# Patient Record
Sex: FEMALE | Race: WHITE | NOT HISPANIC OR LATINO
[De-identification: names, ages, dates, MRNs, and addresses within clinical notes are randomized per-mention and may not be internally consistent; named-entity substitution may affect disease eponyms.]

---

## 2022-08-03 ENCOUNTER — APPOINTMENT (OUTPATIENT)
Dept: ORTHOPEDIC SURGERY | Facility: CLINIC | Age: 67
End: 2022-08-03

## 2022-08-03 VITALS — WEIGHT: 186 LBS | HEIGHT: 62 IN | BODY MASS INDEX: 34.23 KG/M2

## 2022-08-03 DIAGNOSIS — Z78.9 OTHER SPECIFIED HEALTH STATUS: ICD-10-CM

## 2022-08-03 DIAGNOSIS — Z86.79 PERSONAL HISTORY OF OTHER DISEASES OF THE CIRCULATORY SYSTEM: ICD-10-CM

## 2022-08-03 PROBLEM — Z00.00 ENCOUNTER FOR PREVENTIVE HEALTH EXAMINATION: Status: ACTIVE | Noted: 2022-08-03

## 2022-08-03 PROCEDURE — 99204 OFFICE O/P NEW MOD 45 MIN: CPT | Mod: 25

## 2022-08-03 PROCEDURE — 20610 DRAIN/INJ JOINT/BURSA W/O US: CPT

## 2022-08-03 PROCEDURE — 73562 X-RAY EXAM OF KNEE 3: CPT | Mod: RT

## 2022-08-03 PROCEDURE — J3490M: CUSTOM

## 2022-08-03 RX ORDER — ATENOLOL 50 MG/1
TABLET ORAL
Refills: 0 | Status: ACTIVE | COMMUNITY

## 2022-08-03 NOTE — PROCEDURE
[Large Joint Injection] : Large joint injection [Right] : of the right [Knee] : knee [___ cc    6mg] :  Betamethasone (Celestone) ~Vcc of 6mg [___ cc    1%] : Lidocaine ~Vcc of 1%  [___ cc    0.25%] : Bupivacaine (Marcaine) ~Vcc of 0.25%  [] : Patient tolerated procedure well [Call if redness, pain or fever occur] : call if redness, pain or fever occur [Apply ice for 15min out of every hour for the next 12-24 hours as tolerated] : apply ice for 15 minutes out of every hour for the next 12-24 hours as tolerated [Patient was advised to rest the joint(s) for ____ days] : patient was advised to rest the joint(s) for [unfilled] days [Previous OTC use and PT nontherapeutic] : patient has tried OTC's including aspirin, Ibuprofen, Aleve, etc or prescription NSAIDS, and/or exercises at home and/or physical therapy without satisfactory response [Patient had decreased mobility in the joint] : patient had decreased mobility in the joint [Risks, benefits, alternatives discussed / Verbal consent obtained] : the risks benefits, and alternatives have been discussed, and verbal consent was obtained [Prior failure or difficult injection] : prior failure or difficult injection [All ultrasound images have been permanently captured and stored accordingly in our picture archiving and communication system] : All ultrasound images have been permanently captured and stored accordingly in our picture archiving and communication system [Visualization of the needle and placement of injection was performed without complication] : visualization of the needle and placement of injection was performed without complication [Inflammation] : inflammation

## 2022-08-03 NOTE — HISTORY OF PRESENT ILLNESS
[Sudden] : sudden [7] : 7 [Localized] : localized [de-identified] : 68 yo RHD Female here for Right knee pain for since June 22. Denies injury. Pain is medial knee, worse with getting up after sitting for an extended period. Has nighttime symptoms. Tried OTC tylenol/ NSAIDs with minimal relief. Denies mechanical sx. \par \par Retired\par \par No DM/ no blood thinners [] : no [FreeTextEntry1] : right knee  [FreeTextEntry3] : 2 weeks ago  [FreeTextEntry5] : pt states no injury has occurred  [FreeTextEntry9] : ibuprofen

## 2022-08-03 NOTE — PHYSICAL EXAM
[NL (0)] : extension 0 degrees [5___] : hamstring 5[unfilled]/5 [Right] : right knee [Mild tricompartmental OA lateral narrowing] : Mild tricompartmental OA lateral narrowing [Moderate tricompartmental OA medial narrowing] : Moderate tricompartmental OA medial narrowing [Moderate patellofemoral OA] : Moderate patellofemoral OA [] : non-antalgic [TWNoteComboBox7] : flexion 130 degrees

## 2022-08-18 ENCOUNTER — APPOINTMENT (OUTPATIENT)
Dept: ORTHOPEDIC SURGERY | Facility: CLINIC | Age: 67
End: 2022-08-18

## 2022-08-18 VITALS — HEIGHT: 62 IN | BODY MASS INDEX: 34.23 KG/M2 | WEIGHT: 186 LBS

## 2022-08-18 DIAGNOSIS — Z78.9 OTHER SPECIFIED HEALTH STATUS: ICD-10-CM

## 2022-08-18 PROCEDURE — 20610 DRAIN/INJ JOINT/BURSA W/O US: CPT | Mod: RT

## 2022-08-18 PROCEDURE — 99214 OFFICE O/P EST MOD 30 MIN: CPT | Mod: 25

## 2022-08-18 NOTE — IMAGING
[All Views] : anteroposterior, lateral, skyline, and anteroposterior standing [Outside films reviewed] : Outside films reviewed [There are no fractures, subluxations or dislocations. No significant abnormalities are seen] : There are no fractures, subluxations or dislocations. No significant abnormalities are seen [Moderate tricompartmental OA medial narrowing] : Moderate tricompartmental OA medial narrowing [Moderate patellofemoral OA] : Moderate patellofemoral OA [de-identified] : Right \par Knee Examination:\par \par • Constitutional: \par    - Well-developed, well-nourished, in no acute distress\par    - Alert and oriented to person, place and time \par \par • Inspection and Palpation: \par    - Skin: intact\par    - Effusion: none\par    - Medial joint line tenderness: positive\par    - Lateral joint line tenderness: negative\par    - Patellofemoral tenderness: positive\par    - Pes anserine tenderness: negative\par    - Palpable lymphadenopathy: none \par    - Peripheral edema: none\par    - Gait: normal\par \par • Range of Motion (degrees):\par    - Extension: 0\par    - Flexion: 125\par 	\par • Stability: \par    - Anterior drawer test: normal\par    - Lachman test: 1A\par    - Posterior drawer test: normal\par    - Varus stress test: normal\par    - Valgus stress test: normal \par \par • Neurovascular: \par    - Atrophy of quadriceps: absent \par    - Quadriceps strength (out of 5): 5\par    - Hamstring strength (out of 5): 5\par    - Sensation to light touch: intact in sural, saphenous, peroneal and tibial distributions\par    - Capillary refill: less than 2 seconds in toes\par \par • Special Tests:\par    - Medial Rodney’s test: positive\par    - Lateral Rodney’s test: negative \par    - Straight leg raise test: negative\par    - Hip examination: painless range of motion\par \par • Comments:\par    - None \par \par  [FreeTextEntry9] : prior films reviewed consistent with primary osteoarthritis

## 2022-08-18 NOTE — HISTORY OF PRESENT ILLNESS
[de-identified] : • Visit Details:\par    - Visit type: new injury \par    - Worker’s compensation: no\par    - No-fault: no\par \par • Patient Demographics:\par    - Age: 67\par    - Occupation: retired \par    - Sex: female\par \par • Symptom Details: \par    - Laterality: right \par    - Body part: knee \par    - Duration: 06/2022 \par    - Pain severity (out of 10): 9\par    - Pain timing: constant \par    - Pain quality: throbbing, soreness \par    - Pain interferes with sleep: no\par    - Pain radiates distally: no\par    - Associated with swelling: no\par    - Associated with catching and locking: no\par    - Associated with decreased motion: no\par    - Associated with numbness: no\par    - Worse with activity: yes\par    - Improves with rest: yes\par \par • Treatment Details:\par    - Physical therapy: home exercise program\par    - Anti-inflammatory medication: ibuprofen daily\par    - Narcotic medication: no\par    - Corticosteroid injection: yes, 08/03/2022 \par \par • Comments:\par    - Patient reports mild relief from the cortisone injection for 1 week with recurrence of symptoms. She is interested in discussing additional treatment options.  \par

## 2022-08-18 NOTE — DISCUSSION/SUMMARY
[de-identified] : Assessment\par \par The patient presents with history, examination and imaging that are most consistent with a diagnosis of:\par right knee osteoarthritis with exacerbation.\par \par The patient would like to pursue conservative measures at this time. After consideration of various non-operative treatment modalities, the patient would like to proceed with the modalities listed below.\par \par The patient is in agreement with the treatment plan and all questions were answered. \par \par …………………….\par \par Plan\par \par - Counseling: Patient recommended to modify their activities until symptoms resolve. \par - Home Exercise Program: Packet with additional exercises from [facility] provided to patient in the office today. \par - Meloxicam: Medication script sent to the patient’s preferred pharmacy. Patient was instructed to take this medication with food on an as needed basis. Patient educated on the gastrointestinal side effects with excessive use. \par - Injection: Gel injection performed today in the office. Refer to procedure section for further details.\par - Follow-up: in 1 week for next injection\par \par …………………….\par \par Medical Decision Making\par \par • Problem:\par    - Chronic illness with exacerbation - moderate\par • Data:\par    - Review of available external records - low\par • Risk:\par    - Prescription medication - moderate\par    - Injection - moderate\par • MDM Level:\par    - Level 4\par

## 2022-08-18 NOTE — PROCEDURE
[FreeTextEntry3] : Procedure Note\par \par • Injection Details: \par    - Laterality: right \par    - Body part: knee \par    - Anatomic location: joint \par \par • Injection Contents: \par    - 1st Medication name: orthovisc\par    - 1st Medication dose: 30 mg\par    - 2nd Medication name: 1% lidocaine\par    - 2nd Medication dose: 4 mL\par \par • Procedure Narrative:\par    - Informed consent was provided for injection of the specified location. \par    - Patient informed of gel injection risks including allergic reaction, infection and skin discoloration. \par    - Discussed with patient that therapeutic benefit from gel injection was less predictable in the setting of recent cortisone injection.\par    - The above specified medications were injected using aseptic technique. \par    - A sterile dressing was applied. \par    - There were no complications. \par \par

## 2022-08-25 ENCOUNTER — APPOINTMENT (OUTPATIENT)
Dept: ORTHOPEDIC SURGERY | Facility: CLINIC | Age: 67
End: 2022-08-25

## 2022-08-25 VITALS — BODY MASS INDEX: 34.23 KG/M2 | HEIGHT: 62 IN | WEIGHT: 186 LBS

## 2022-08-25 PROCEDURE — 99214 OFFICE O/P EST MOD 30 MIN: CPT | Mod: 25

## 2022-08-25 PROCEDURE — 20610 DRAIN/INJ JOINT/BURSA W/O US: CPT

## 2022-08-25 NOTE — HISTORY OF PRESENT ILLNESS
[de-identified] : • Visit Details:\par    - Visit type: follow up\par    - Worker’s compensation: no\par    - No-fault: no\par \par • Patient Demographics:\par    - Age: 67\par    - Occupation: retired \par    - Sex: female\par \par • Symptom Details: \par    - Laterality: right \par    - Body part: knee \par    - Duration: 06/2022 \par    - Pain severity (out of 10): 4\par    - Pain timing: constant \par    - Pain quality: throbbing, soreness \par    - Pain interferes with sleep: yes\par    - Pain radiates distally: no\par    - Associated with swelling: no\par    - Associated with catching and locking: no\par    - Associated with decreased motion: no\par    - Associated with numbness: no\par    - Worse with activity: yes\par    - Improves with rest: yes\par \par • Treatment Details:\par    - Physical therapy: home exercise program\par    - Anti-inflammatory medication: ibuprofen daily\par    - Narcotic medication: no\par    - Corticosteroid injection: yes, 08/03/2022 \par \par • Comments:\par    - Patient reports mild relief from the first gel injection. She would like to proceed with the second injection. \par

## 2022-08-25 NOTE — PROCEDURE
[FreeTextEntry3] : • Injection Details: \par  - Laterality: right \par  - Body part: knee \par  - Anatomic location: joint \par \par • Injection Contents: \par  - 1st Medication name: orthovisc\par  - 1st Medication dose: 30 mg\par  - 2nd Medication name: 1% lidocaine\par  - 2nd Medication dose: 4 mL\par \par • Procedure Narrative:\par  - Informed consent was provided for injection of the specified location. \par  - Patient informed of gel injection risks including allergic reaction, infection and skin discoloration. \par  - The above specified medications were injected using aseptic technique. \par  - A sterile dressing was applied. \par  - There were no complications.

## 2022-08-25 NOTE — DISCUSSION/SUMMARY
[de-identified] : Assessment\par \par The patient presents with history, examination and imaging that are most consistent with a diagnosis of:\par right knee osteoarthritis with exacerbation\par \par The patient would like to pursue conservative measures at this time. After consideration of various non-operative treatment modalities, the patient would like to proceed with the modalities listed below.\par \par The patient is in agreement with the treatment plan and all questions were answered. \par \par …………………….\par \par Plan\par \par - Counseling: Patient recommended to modify their activities until symptoms resolve. \par - Home Exercise Program: Patient will continue program as provided at previous visit\par - Meloxicam: Patient was instructed to take this medication with food on an as needed basis. Patient educated on the gastrointestinal side effects with excessive use. \par - Injection: Gel injection performed today in the office. Refer to procedure section for further details.\par - Follow-up: in 1 week for next injection\par \par …………………….\par \par Medical Decision Making\par \par • Problem:\par  - Chronic illness with exacerbation - moderate\par • Data:\par  - Review of available external records - low\par • Risk:\par  - Injection - moderate\par • MDM Level:\par  - Level 4\par

## 2022-08-25 NOTE — IMAGING
[de-identified] : Right \par Knee Examination:\par \par • Constitutional: \par  - Well-developed, well-nourished, in no acute distress\par  - Alert and oriented to person, place and time \par \par • Inspection and Palpation: \par  - Skin: intact\par  - Effusion: none\par  - Medial joint line tenderness: positive\par  - Lateral joint line tenderness: negative\par  - Patellofemoral tenderness: positive\par  - Pes anserine tenderness: negative\par  - Palpable lymphadenopathy: none \par  - Peripheral edema: none\par  - Gait: normal\par \par • Range of Motion (degrees):\par  - Extension: 0\par  - Flexion: 125\par 	\par • Stability: \par  - Anterior drawer test: normal\par  - Lachman test: 1A\par  - Posterior drawer test: normal\par  - Varus stress test: normal\par  - Valgus stress test: normal \par \par • Neurovascular: \par  - Atrophy of quadriceps: absent \par  - Quadriceps strength (out of 5): 5\par  - Hamstring strength (out of 5): 5\par  - Sensation to light touch: intact in sural, saphenous, peroneal and tibial distributions\par  - Capillary refill: less than 2 seconds in toes\par \par • Special Tests:\par  - Medial Rodney’s test: positive\par  - Lateral Rodney’s test: negative \par  - Straight leg raise test: negative\par  - Hip examination: painless range of motion\par \par • Comments:\par  - None \par

## 2022-09-01 ENCOUNTER — APPOINTMENT (OUTPATIENT)
Dept: ORTHOPEDIC SURGERY | Facility: CLINIC | Age: 67
End: 2022-09-01

## 2022-09-01 VITALS — HEIGHT: 62 IN | BODY MASS INDEX: 34.23 KG/M2 | WEIGHT: 186 LBS

## 2022-09-01 PROCEDURE — 20610 DRAIN/INJ JOINT/BURSA W/O US: CPT | Mod: RT

## 2022-09-01 PROCEDURE — 99214 OFFICE O/P EST MOD 30 MIN: CPT | Mod: 25

## 2022-09-01 NOTE — HISTORY OF PRESENT ILLNESS
[de-identified] : • Visit Details:\par    - Visit type: follow up\par    - Worker’s compensation: no\par    - No-fault: no\par \par • Patient Demographics:\par    - Age: 67\par    - Occupation: retired \par    - Sex: female\par \par • Symptom Details: \par    - Laterality: right \par    - Body part: knee \par    - Duration: 06/2022 \par    - Pain severity (out of 10): 1\par    - Pain timing: constant \par    - Pain quality: throbbing, soreness \par    - Pain interferes with sleep: no\par    - Pain radiates distally: no\par    - Associated with swelling: no\par    - Associated with catching and locking: no\par    - Associated with decreased motion: no\par    - Associated with numbness: no\par    - Worse with activity: no\par    - Improves with rest: yes\par \par • Treatment Details:\par    - Physical therapy: home exercise program\par    - Anti-inflammatory medication: ibuprofen daily\par    - Narcotic medication: no\par    - Corticosteroid injection: yes, 08/03/2022 \par \par • Comments:\par    - Patient reports symptomatic improvement with two previous orthovisc injections. Would like to proceed with her third injection. \par

## 2022-09-01 NOTE — DISCUSSION/SUMMARY
[de-identified] : Assessment\par \par The patient presents with history, examination and imaging that are most consistent with a diagnosis of:\par right knee osteoarthritis with exacerbation\par \par The patient would like to pursue conservative measures at this time. After consideration of various non-operative treatment modalities, the patient would like to proceed with the modalities listed below.\par \par The patient is in agreement with the treatment plan and all questions were answered. \par \par …………………….\par \par Plan\par \par - Counseling: Patient recommended to modify their activities until symptoms resolve. \par - Home Exercise Program: Patient will continue program as provided at previous visit\par - Meloxicam: Patient was instructed to take this medication with food on an as needed basis. Patient educated on the gastrointestinal side effects with excessive use. \par - Injection: Gel injection performed today in the office. Refer to procedure section for further details.\par - Follow-up: in 1 week for final injection\par \par …………………….\par \par Medical Decision Making\par \par • Problem:\par  - Chronic illness with exacerbation - moderate\par • Data:\par  - Review of available external records - low\par • Risk:\par  - Injection - moderate\par • MDM Level:\par  - Level 4\par

## 2022-09-01 NOTE — IMAGING
[de-identified] : Right \par Knee Examination:\par \par • Constitutional: \par  - Well-developed, well-nourished, in no acute distress\par  - Alert and oriented to person, place and time \par \par • Inspection and Palpation: \par  - Skin: intact\par  - Effusion: none\par  - Medial joint line tenderness: positive\par  - Lateral joint line tenderness: negative\par  - Patellofemoral tenderness: positive\par  - Pes anserine tenderness: negative\par  - Palpable lymphadenopathy: none \par  - Peripheral edema: none\par  - Gait: normal\par \par • Range of Motion (degrees):\par  - Extension: 0\par  - Flexion: 125\par 	\par • Stability: \par  - Anterior drawer test: normal\par  - Lachman test: 1A\par  - Posterior drawer test: normal\par  - Varus stress test: normal\par  - Valgus stress test: normal \par \par • Neurovascular: \par  - Atrophy of quadriceps: absent \par  - Quadriceps strength (out of 5): 5\par  - Hamstring strength (out of 5): 5\par  - Sensation to light touch: intact in sural, saphenous, peroneal and tibial distributions\par  - Capillary refill: less than 2 seconds in toes\par \par • Special Tests:\par  - Medial Rodney’s test: positive\par  - Lateral Rodney’s test: negative \par  - Straight leg raise test: negative\par  - Hip examination: painless range of motion\par \par • Comments:\par  - None \par

## 2022-09-01 NOTE — PROCEDURE
[FreeTextEntry3] : Procedure Note: • Injection Details: \par  - Laterality: right \par  - Body part: knee \par  - Anatomic location: joint \par \par • Injection Contents: \par  - 1st Medication name: orthovisc\par  - 1st Medication dose: 30 mg\par  - 2nd Medication name: 1% lidocaine\par  - 2nd Medication dose: 4 mL\par \par • Procedure Narrative:\par  - Informed consent was provided for injection of the specified location. \par  - Patient informed of gel injection risks including allergic reaction, infection and skin discoloration. \par  - The above specified medications were injected using aseptic technique. \par  - A sterile dressing was applied. \par  - There were no complications. \par

## 2022-09-15 ENCOUNTER — APPOINTMENT (OUTPATIENT)
Dept: ORTHOPEDIC SURGERY | Facility: CLINIC | Age: 67
End: 2022-09-15

## 2022-09-15 VITALS — BODY MASS INDEX: 34.23 KG/M2 | HEIGHT: 62 IN | WEIGHT: 186 LBS

## 2022-09-15 DIAGNOSIS — M17.11 UNILATERAL PRIMARY OSTEOARTHRITIS, RIGHT KNEE: ICD-10-CM

## 2022-09-15 PROCEDURE — 20610 DRAIN/INJ JOINT/BURSA W/O US: CPT | Mod: RT

## 2022-09-15 PROCEDURE — 99214 OFFICE O/P EST MOD 30 MIN: CPT | Mod: 25

## 2022-09-15 NOTE — HISTORY OF PRESENT ILLNESS
[de-identified] : • Visit Details:\par    - Visit type: follow up\par    - Worker’s compensation: no\par    - No-fault: no\par \par • Patient Demographics:\par    - Age: 67\par    - Occupation: retired \par    - Sex: female\par \par • Symptom Details: \par    - Laterality: right \par    - Body part: knee \par    - Duration: 06/2022 \par    - Pain severity (out of 10): 2-3\par    - Pain timing: constant \par    - Pain quality: throbbing, soreness \par    - Pain interferes with sleep: no\par    - Pain radiates distally: no\par    - Associated with swelling: no\par    - Associated with catching and locking: no\par    - Associated with decreased motion: no\par    - Associated with numbness: no\par    - Worse with activity: no\par    - Improves with rest: yes\par \par • Treatment Details:\par    - Physical therapy: home exercise program\par    - Anti-inflammatory medication: ibuprofen daily\par    - Narcotic medication: no\par    - Corticosteroid injection: yes, 08/03/2022 \par \par • Comments:\par    - Patient reports continued improvement since her last injection. As she has mild persistent symptoms, presenting today for fourth and final HA injection.\par

## 2022-09-15 NOTE — DISCUSSION/SUMMARY
[de-identified] : Assessment\par \par The patient presents with history, examination and imaging that are most consistent with a diagnosis of:\par right knee osteoarthritis with exacerbation\par \par The patient would like to pursue conservative measures at this time. After consideration of various non-operative treatment modalities, the patient would like to proceed with the modalities listed below.\par \par The patient is in agreement with the treatment plan and all questions were answered. \par \par …………………….\par \par Plan\par \par - Counseling: Patient recommended to modify their activities until symptoms resolve. \par - Home Exercise Program: Patient will continue program as provided at previous visit\par - Meloxicam: Patient was instructed to take this medication with food on an as needed basis. Patient educated on the gastrointestinal side effects with excessive use. \par - Injection: Gel injection performed today in the office. Refer to procedure section for further details.\par - Follow-up: as needed if symptoms recur or fail to improve\par

## 2022-09-15 NOTE — PROCEDURE
[FreeTextEntry3] : Procedure Note\par \par • Injection Details: \par    - Laterality: right \par    - Body part: knee \par    - Anatomic location: joint \par \par • Injection Contents: \par    - 1st Medication: orthovisc\par \par • Procedure Narrative:\par    - Informed consent was provided for injection of the specified location. \par    - Patient informed of hyaluronic acid risks including infection and local inflammatory reaction.\par    - The above specified medications were injected using aseptic technique. \par    - A sterile dressing was applied. \par    - There were no complications. \par \par

## 2022-10-27 ENCOUNTER — APPOINTMENT (OUTPATIENT)
Dept: ORTHOPEDIC SURGERY | Facility: CLINIC | Age: 67
End: 2022-10-27

## 2022-10-27 VITALS — WEIGHT: 186 LBS | HEIGHT: 62 IN | BODY MASS INDEX: 34.23 KG/M2

## 2022-10-27 PROCEDURE — 99214 OFFICE O/P EST MOD 30 MIN: CPT | Mod: 25

## 2022-10-27 PROCEDURE — 73564 X-RAY EXAM KNEE 4 OR MORE: CPT | Mod: LT

## 2022-10-27 PROCEDURE — 20610 DRAIN/INJ JOINT/BURSA W/O US: CPT

## 2022-10-27 RX ORDER — ATENOLOL AND CHLORTHALIDONE 50; 25 MG/1; MG/1
50-25 TABLET ORAL
Qty: 90 | Refills: 0 | Status: ACTIVE | COMMUNITY
Start: 2022-07-13

## 2022-10-27 NOTE — DISCUSSION/SUMMARY
[de-identified] : Assessment\par \par The patient presents with history, examination and imaging that are most consistent with a diagnosis of:\par left knee osteoarthritis with exacerbation\par \par The patient would like to pursue conservative measures at this time. She has attempted rest, activity modification, NSAIDs and HEP without relief. Will proceed with injection for symptomatic relief. After consideration of various non-operative treatment modalities, the patient would like to proceed with the modalities listed below.\par \par The risks and benefits of additional diagnostic imaging were discussed. The patient agrees to defer at this time in favor of a trial of conservative treatment.\par \par The patient is in agreement with the treatment plan and all questions were answered. \par \par ..........\par \par Plan\par \par - Counseling: Patient recommended to modify their activities until symptoms resolve. \par - Home Exercise Program: Packet with exercises from [facility] provided to patient in the office today. \par - Injection: Hyaluronic acid injection performed today in the office. Refer to procedure section for further details.\par - Follow-up: 1 week for second injection\par \par ..........\par \par Medical Decision Making\par \par • Problem:\par    - Chronic illness with exacerbation - moderate\par • Data:\par    - Review of available external records - low\par • Risk:\par    - Injection - moderate\par • MDM Level:\par    - Level 4\par

## 2022-10-27 NOTE — HISTORY OF PRESENT ILLNESS
[de-identified] : The patient presents to clinic for evaluation of the complaint described below.\par \par • Visit Details:\par    - Visit type: new injury \par    - Worker’s compensation: no\par    - No-fault: no\par \par • Patient Demographics:\par    - Age: 67 year\par    - Occupation: retired\par    - Sex: female\par \par • Symptom Details: \par    - Laterality: left\par    - Body part: knee \par    - Duration:  2 weeks\par    - Pain severity (out of 10):  5\par    - Pain timing:  intermittent \par    - Pain quality: dull\par    - Pain interferes with sleep: no\par    - Pain radiates distally: no\par    - Associated with swelling: yes\par    - Associated with catching and locking: no\par    - Associated with decreased motion: no\par    - Associated with numbness: no\par    - Worse with activity: yes\par    - Improves with rest: yes\par \par • Treatment Details:\par    - Physical therapy: yes home exercises\par    - Anti-inflammatory medication: yes OTC \par    - Narcotic medication: no\par    - Corticosteroid injection: no\par \par • Comments:\par    - Medial left knee pain, worse with activity. Attempted rest, activity modification, NSAID and home exercises without relief. Requesting injection. She reports improvement of the right knee with gel injections. \par \par

## 2022-10-27 NOTE — IMAGING
[de-identified] : Left Knee Examination:\par \par • Constitutional: \par    - Well-developed, well-nourished, in no acute distress\par    - Alert and oriented to person, place and time \par \par • Inspection and Palpation: \par    - Skin: intact\par    - Effusion: none\par    - Medial joint line tenderness: positive\par    - Lateral joint line tenderness: negative\par    - Patellofemoral tenderness: negative\par    - Pes anserine tenderness: negative\par    - Palpable lymphadenopathy: none \par    - Peripheral edema: none\par    - Gait: normal\par \par • Range of Motion (degrees):\par    - Extension: 0\par    - Flexion: 135\par 	\par • Stability: \par    - Anterior drawer test: normal\par    - Lachman test: 1A\par    - Posterior drawer test: normal\par    - Varus stress test: normal\par    - Valgus stress test: normal \par \par • Neurovascular: \par    - Atrophy of quadriceps: absent \par    - Quadriceps strength (out of 5): 5\par    - Hamstring strength (out of 5): 5\par    - Sensation to light touch: intact in sural, saphenous, peroneal and tibial distributions\par    - Capillary refill: less than 2 seconds in toes\par \par • Special Tests:\par    - Medial Rodney’s test: positive\par    - Lateral Rodney’s test: negative \par    - Patellofemoral grind: negative\par    - Straight leg raise test: negative\par    - Hip examination: painless range of motion\par \par • Comments:\par    - None \par \par  [Left] : left knee [All Views] : anteroposterior, lateral, skyline, and anteroposterior standing [Moderate tricompartmental OA medial narrowing] : Moderate tricompartmental OA medial narrowing [Mild patellofemoral OA] : Mild patellofemoral OA

## 2022-10-27 NOTE — PROCEDURE
[FreeTextEntry3] : Procedure Note\par \par • Injection Details: \par    - Laterality: left\par    - Body part: knee \par    - Anatomic location: joint \par \par • Injection Contents: \par    - 1st Medication: Orthovisc\par    - 2nd Medication: 1% lidocaine, 5 mL\par \par • Procedure Narrative:\par    - Informed consent was provided for injection of the specified location.  \par    - Patient informed of hyaluronic acid risks including infection and local inflammatory reaction.\par    - The above specified medications were injected using aseptic technique. \par    - A sterile dressing was applied. \par    - There were no complications. \par \par

## 2022-11-03 ENCOUNTER — APPOINTMENT (OUTPATIENT)
Dept: ORTHOPEDIC SURGERY | Facility: CLINIC | Age: 67
End: 2022-11-03

## 2022-11-03 VITALS — BODY MASS INDEX: 34.23 KG/M2 | WEIGHT: 186 LBS | HEIGHT: 62 IN

## 2022-11-03 PROCEDURE — 99214 OFFICE O/P EST MOD 30 MIN: CPT | Mod: 25

## 2022-11-03 PROCEDURE — 20610 DRAIN/INJ JOINT/BURSA W/O US: CPT | Mod: LT

## 2022-11-03 NOTE — DISCUSSION/SUMMARY
[de-identified] : Assessment\par \par The patient presents with history, examination and imaging that are most consistent with a diagnosis of:\par left knee osteoarthritis with exacerbation\par \par The patient would like to pursue conservative measures at this time. Patient would like to proceed with her second gel injection. \par \par The patient is in agreement with the treatment plan and all questions were answered. \par \par ..........\par \par Plan\par \par - Counseling: Patient recommended to modify their activities until symptoms resolve. \par - Home Exercise Program: Packet with exercises from [employer] provided to patient in the office today. \par - Injection: Hyaluronic acid injection performed today in the office. Refer to procedure section for further details.\par - Follow-up: 1 week for third injection\par \par ..........\par \par Medical Decision Making\par \par • Problem:\par  - Chronic illness with exacerbation - moderate\par • Data:\par  - Review of available external records - low\par • Risk:\par  - Injection - moderate\par • MDM Level:\par  - Level 4\par

## 2022-11-03 NOTE — HISTORY OF PRESENT ILLNESS
[de-identified] : The patient presents to clinic for evaluation of the complaint described below.\par \par • Visit Details:\par    - Visit type: follow up\par    - Worker’s compensation: no\par    - No-fault: no\par \par • Patient Demographics:\par    - Age: 67 year\par    - Occupation: retired\par    - Sex: female\par \par • Symptom Details: \par    - Laterality: left\par    - Body part: knee \par    - Duration:  2 weeks\par    - Pain severity (out of 10):  6-7\par    - Pain timing:  intermittent \par    - Pain quality: dull\par    - Pain interferes with sleep: no\par    - Pain radiates distally: no\par    - Associated with swelling: yes\par    - Associated with catching and locking: no\par    - Associated with decreased motion: no\par    - Associated with numbness: no\par    - Worse with activity: yes\par    - Improves with rest: yes\par \par • Treatment Details:\par    - Physical therapy: yes home exercises\par    - Anti-inflammatory medication: yes OTC \par    - Narcotic medication: no\par    - Corticosteroid injection: no\par \par • Comments:\par    - States no changes since last appt. still having pain and discomfort. Here for gel injection #2.

## 2022-11-03 NOTE — IMAGING
[de-identified] : Left Knee Examination:\par \par • Constitutional: \par  - Well-developed, well-nourished, in no acute distress\par  - Alert and oriented to person, place and time \par \par • Inspection and Palpation: \par  - Skin: intact\par  - Effusion: none\par  - Medial joint line tenderness: positive\par  - Lateral joint line tenderness: negative\par  - Patellofemoral tenderness: negative\par  - Pes anserine tenderness: negative\par  - Palpable lymphadenopathy: none \par  - Peripheral edema: none\par  - Gait: normal\par \par • Range of Motion (degrees):\par  - Extension: 0\par  - Flexion: 135\par 	\par • Stability: \par  - Anterior drawer test: normal\par  - Lachman test: 1A\par  - Posterior drawer test: normal\par  - Varus stress test: normal\par  - Valgus stress test: normal \par \par • Neurovascular: \par  - Atrophy of quadriceps: absent \par  - Quadriceps strength (out of 5): 5\par  - Hamstring strength (out of 5): 5\par  - Sensation to light touch: intact in sural, saphenous, peroneal and tibial distributions\par  - Capillary refill: less than 2 seconds in toes\par \par • Special Tests:\par  - Medial Rodney’s test: positive\par  - Lateral Rodney’s test: negative \par  - Patellofemoral grind: negative\par  - Straight leg raise test: negative\par  - Hip examination: painless range of motion\par \par • Comments:\par  - None \par

## 2022-11-10 ENCOUNTER — APPOINTMENT (OUTPATIENT)
Dept: ORTHOPEDIC SURGERY | Facility: CLINIC | Age: 67
End: 2022-11-10

## 2022-11-10 VITALS — BODY MASS INDEX: 34.23 KG/M2 | HEIGHT: 62 IN | WEIGHT: 186 LBS

## 2022-11-10 PROCEDURE — 99214 OFFICE O/P EST MOD 30 MIN: CPT | Mod: 25

## 2022-11-10 PROCEDURE — 20610 DRAIN/INJ JOINT/BURSA W/O US: CPT | Mod: LT

## 2022-11-10 NOTE — IMAGING
[de-identified] : Left Knee Examination:\par \par • Constitutional: \par  - Well-developed, well-nourished, in no acute distress\par  - Alert and oriented to person, place and time \par \par • Inspection and Palpation: \par  - Skin: intact\par  - Effusion: none\par  - Medial joint line tenderness: positive\par  - Lateral joint line tenderness: negative\par  - Patellofemoral tenderness: negative\par  - Pes anserine tenderness: negative\par  - Palpable lymphadenopathy: none \par  - Peripheral edema: none\par  - Gait: normal\par \par • Range of Motion (degrees):\par  - Extension: 0\par  - Flexion: 135\par 	\par • Stability: \par  - Anterior drawer test: normal\par  - Lachman test: 1A\par  - Posterior drawer test: normal\par  - Varus stress test: normal\par  - Valgus stress test: normal \par \par • Neurovascular: \par  - Atrophy of quadriceps: absent \par  - Quadriceps strength (out of 5): 5\par  - Hamstring strength (out of 5): 5\par  - Sensation to light touch: intact in sural, saphenous, peroneal and tibial distributions\par  - Capillary refill: less than 2 seconds in toes\par \par • Special Tests:\par  - Medial Rodney’s test: positive\par  - Lateral Rodney’s test: negative \par  - Patellofemoral grind: negative\par  - Straight leg raise test: negative\par  - Hip examination: painless range of motion\par \par • Comments:\par  - None \par

## 2022-11-10 NOTE — PROCEDURE
[FreeTextEntry3] :  • Injection Details: \par  - Laterality: left\par  - Body part: knee \par  - Anatomic location: joint \par \par • Injection Contents: \par  - 1st Medication: Orthovisc\par  - 2nd Medication: 1% lidocaine, 3 mL\par \par • Procedure Narrative:\par  - Informed consent was provided for injection of the specified location. \par  - Patient informed of hyaluronic acid risks including infection and local inflammatory reaction.\par  - The above specified medications were injected using aseptic technique. \par  - A sterile dressing was applied. \par  - There were no complications. \par

## 2022-11-10 NOTE — DISCUSSION/SUMMARY
[de-identified] : Assessment\par \par The patient presents with history, examination and imaging that are most consistent with a diagnosis of:\par left knee osteoarthritis with exacerbation\par \par The patient would like to pursue conservative measures at this time. Patient would like to proceed with her third gel injection. \par \par The patient is in agreement with the treatment plan and all questions were answered. \par \par ..........\par \par Plan\par \par - Counseling: Patient recommended to modify their activities until symptoms resolve. \par - Home Exercise Program: Continue as per prior packet. \par - Injection: Hyaluronic acid injection performed today in the office. Refer to procedure section for further details.\par - Follow-up: 1 week for fourth and final injection\par \par ..........\par \par Medical Decision Making\par \par • Problem:\par  - Chronic illness with exacerbation - moderate\par • Data:\par  - Review of available external records - low\par • Risk:\par  - Injection - moderate\par • MDM Level:\par  - Level 4\par

## 2022-11-10 NOTE — HISTORY OF PRESENT ILLNESS
[de-identified] : The patient presents to clinic for evaluation of the complaint described below.\par \par • Visit Details:\par    - Visit type: follow up\par    - Worker’s compensation: no\par    - No-fault: no\par \par • Patient Demographics:\par    - Age: 67 year\par    - Occupation: retired\par    - Sex: female\par \par • Symptom Details: \par    - Laterality: left\par    - Body part: knee \par    - Duration:  1 month\par    - Pain severity (out of 10):  4\par    - Pain timing:  intermittent \par    - Pain quality: dull\par    - Pain interferes with sleep: no\par    - Pain radiates distally: no\par    - Associated with swelling: yes\par    - Associated with catching and locking: no\par    - Associated with decreased motion: no\par    - Associated with numbness: no\par    - Worse with activity: yes\par    - Improves with rest: yes\par \par • Treatment Details:\par    - Physical therapy: yes home exercises\par    - Anti-inflammatory medication: yes OTC \par    - Narcotic medication: no\par    - Corticosteroid injection: no\par \par • Comments:\par    - States mild improvement since the previous injection. Here for gel injection #3.

## 2022-11-17 ENCOUNTER — APPOINTMENT (OUTPATIENT)
Dept: ORTHOPEDIC SURGERY | Facility: CLINIC | Age: 67
End: 2022-11-17

## 2022-11-17 VITALS — HEIGHT: 62 IN | WEIGHT: 186 LBS | BODY MASS INDEX: 34.23 KG/M2

## 2022-11-17 PROCEDURE — 20610 DRAIN/INJ JOINT/BURSA W/O US: CPT | Mod: LT

## 2022-11-17 PROCEDURE — 99214 OFFICE O/P EST MOD 30 MIN: CPT | Mod: 25

## 2022-11-17 NOTE — DISCUSSION/SUMMARY
[de-identified] : Assessment\par \par The patient presents with history, examination and imaging that are most consistent with a diagnosis of:\par left knee osteoarthritis with exacerbation\par \par The patient would like to pursue conservative measures at this time. Patient would like to proceed with her fourth gel injection. \par \par The patient is in agreement with the treatment plan and all questions were answered. \par \par ..........\par \par Plan\par \par - Counseling: Patient recommended to modify their activities until symptoms resolve. \par - Home Exercise Program: Continue as per prior packet. \par - Injection: Hyaluronic acid injection performed today in the office. Refer to procedure section for further details.\par - Follow-up: as needed \par \par ..........\par \par Medical Decision Making\par \par • Problem:\par  - Chronic illness with exacerbation - moderate\par • Data:\par  - Review of available external records - low\par • Risk:\par  - Injection - moderate\par • MDM Level:\par  - Level 4\par

## 2022-11-17 NOTE — HISTORY OF PRESENT ILLNESS
[de-identified] : The patient presents to clinic for evaluation of the complaint described below.\par \par • Visit Details:\par    - Visit type: follow up\par    - Worker’s compensation: no\par    - No-fault: no\par \par • Patient Demographics:\par    - Age: 67 year\par    - Occupation: retired\par    - Sex: female\par \par • Symptom Details: \par    - Laterality: left\par    - Body part: knee \par    - Duration:  2 months\par    - Pain severity (out of 10):  1\par    - Pain timing:  intermittent \par    - Pain quality: dull\par    - Pain interferes with sleep: no\par    - Pain radiates distally: no\par    - Associated with swelling: yes\par    - Associated with catching and locking: no\par    - Associated with decreased motion: no\par    - Associated with numbness: no\par    - Worse with activity: yes\par    - Improves with rest: yes\par \par • Treatment Details:\par    - Physical therapy: yes home exercises\par    - Anti-inflammatory medication: yes OTC \par    - Narcotic medication: no\par    - Corticosteroid injection: no\par \par • Comments:\par    - States overall the knee feels better since her last visit. Here for gel injection #4.

## 2022-11-17 NOTE — PROCEDURE
[FreeTextEntry3] : • Injection Details: \par  - Laterality: left\par  - Body part: knee \par  - Anatomic location: joint \par \par • Injection Contents: \par  - 1st Medication: Orthovisc\par  - 2nd Medication: 1% lidocaine, 3 mL\par \par • Procedure Narrative:\par  - Informed consent was provided for injection of the specified location. \par  - Patient informed of hyaluronic acid risks including infection and local inflammatory reaction.\par  - The above specified medications were injected using aseptic technique. \par  - A sterile dressing was applied. \par  - There were no complications.

## 2022-11-17 NOTE — IMAGING
[de-identified] : Left Knee Examination:\par \par • Constitutional: \par  - Well-developed, well-nourished, in no acute distress\par  - Alert and oriented to person, place and time \par \par • Inspection and Palpation: \par  - Skin: intact\par  - Effusion: none\par  - Medial joint line tenderness: positive\par  - Lateral joint line tenderness: negative\par  - Patellofemoral tenderness: negative\par  - Pes anserine tenderness: negative\par  - Palpable lymphadenopathy: none \par  - Peripheral edema: none\par  - Gait: normal\par \par • Range of Motion (degrees):\par  - Extension: 0\par  - Flexion: 135\par 	\par • Stability: \par  - Anterior drawer test: normal\par  - Lachman test: 1A\par  - Posterior drawer test: normal\par  - Varus stress test: normal\par  - Valgus stress test: normal \par \par • Neurovascular: \par  - Atrophy of quadriceps: absent \par  - Quadriceps strength (out of 5): 5\par  - Hamstring strength (out of 5): 5\par  - Sensation to light touch: intact in sural, saphenous, peroneal and tibial distributions\par  - Capillary refill: less than 2 seconds in toes\par \par • Special Tests:\par  - Medial Rodney’s test: positive\par  - Lateral Rodney’s test: negative \par  - Patellofemoral grind: negative\par  - Straight leg raise test: negative\par  - Hip examination: painless range of motion\par \par • Comments:\par  - None \par

## 2022-12-15 ENCOUNTER — APPOINTMENT (OUTPATIENT)
Dept: ORTHOPEDIC SURGERY | Facility: CLINIC | Age: 67
End: 2022-12-15

## 2022-12-15 VITALS — BODY MASS INDEX: 34.23 KG/M2 | WEIGHT: 186 LBS | HEIGHT: 62 IN

## 2022-12-15 PROCEDURE — 99214 OFFICE O/P EST MOD 30 MIN: CPT | Mod: 25

## 2022-12-15 PROCEDURE — 20610 DRAIN/INJ JOINT/BURSA W/O US: CPT

## 2022-12-15 RX ORDER — MELOXICAM 15 MG/1
15 TABLET ORAL DAILY
Qty: 30 | Refills: 0 | Status: ACTIVE | COMMUNITY
Start: 2022-08-18 | End: 1900-01-01

## 2022-12-15 NOTE — IMAGING
[de-identified] : Left Knee Examination:\par \par • Constitutional: \par  - Well-developed, well-nourished, in no acute distress\par  - Alert and oriented to person, place and time \par \par • Inspection and Palpation: \par  - Skin: intact\par  - Effusion: none\par  - Medial joint line tenderness: positive\par  - Lateral joint line tenderness: negative\par  - Patellofemoral tenderness: negative\par  - Pes anserine tenderness: negative\par  - Palpable lymphadenopathy: none \par  - Peripheral edema: none\par  - Gait: normal\par \par • Range of Motion (degrees):\par  - Extension: 0\par  - Flexion: 135\par 	\par • Stability: \par  - Anterior drawer test: normal\par  - Lachman test: 1A\par  - Posterior drawer test: normal\par  - Varus stress test: normal\par  - Valgus stress test: normal \par \par • Neurovascular: \par  - Atrophy of quadriceps: absent \par  - Quadriceps strength (out of 5): 5\par  - Hamstring strength (out of 5): 5\par  - Sensation to light touch: intact in sural, saphenous, peroneal and tibial distributions\par  - Capillary refill: less than 2 seconds in toes\par \par • Special Tests:\par  - Medial Rodney’s test: positive\par  - Lateral Rodney’s test: negative \par  - Patellofemoral grind: negative\par  - Straight leg raise test: negative\par  - Hip examination: painless range of motion\par \par • Comments:\par  - None \par

## 2022-12-15 NOTE — HISTORY OF PRESENT ILLNESS
[FreeTextEntry1] : Left knee  [de-identified] : (Follow-Up Visit) \par \par Symptom Details\par -Body part: Left knee \par -Pain score at rest: 6\par -Pain score with activity: 8\par -Improvement since last visit: no\par \par Interval Treatment Details\par -Activity modification: rest\par -Medication: ibuprofen\par -Physical therapy: no\par -Home exercise program: yes\par -Injection: yes orthovisc 1 month ago, but has not helped\par -MRI: \par \par \par Please refer to previous office visit notes for additional history.\par

## 2022-12-15 NOTE — DISCUSSION/SUMMARY
[de-identified] : (Assessment)\par \par History, clinical examination and imaging were most consistent with:\par -left knee osteoarthritis\par \par The diagnosis was explained in detail. The potential non-surgical and surgical treatments were reviewed. The relative risks and benefits of each option were considered relative to the patient’s age, activity level, medical history, symptom severity and previously attempted treatments. \par \par -The patient chose to continue with non-surgical treatment.\par -The patient is interested in a cortisone injection today for symptom relief.\par -We discussed that in the setting of recent HA injection, response to cortisone is less predictable. She would like to proceed. \par -We discussed that should she fail to improve, she would be a candidate for TKA referral. \par \par \par (Plan)\par \par -Activity modification\par -Physical therapy: script provided\par -Injection: performed in the office today, details in procedure note. \par -Meloxicam script renewal was provided, GI precautions given\par -Follow up: 6 weeks if failing to improve\par \par \par (MDM) \par \par Problem Complexity\par -Moderate: chronic illness with exacerbation\par \par Risk\par -Moderate: injection\par \par \par (Injection Procedure Note)\par \par Laterality \par -Left\par \par Location\par -Knee joint\par \par Contents\par -40 mg kenalog \par -5 mL of 1% lidocaine\par \par Narrative\par -Discussed possible risks of corticosteroid injection including hyperglycemia, infection and skin discoloration. \par - An interval of at least 3 months between an injection and any future surgical intervention involving implants was recommended to mitigate infection risk.\par -Informed consent was obtained.\par -Injection performed using aseptic technique. Tolerated well with no complications. \par \par

## 2023-03-16 ENCOUNTER — APPOINTMENT (OUTPATIENT)
Dept: ORTHOPEDIC SURGERY | Facility: CLINIC | Age: 68
End: 2023-03-16
Payer: MEDICARE

## 2023-03-16 VITALS — HEIGHT: 62 IN | BODY MASS INDEX: 34.23 KG/M2 | WEIGHT: 186 LBS

## 2023-03-16 DIAGNOSIS — M17.12 UNILATERAL PRIMARY OSTEOARTHRITIS, LEFT KNEE: ICD-10-CM

## 2023-03-16 PROCEDURE — 99214 OFFICE O/P EST MOD 30 MIN: CPT | Mod: 25

## 2023-03-16 PROCEDURE — 20610 DRAIN/INJ JOINT/BURSA W/O US: CPT | Mod: LT

## 2023-03-16 NOTE — DISCUSSION/SUMMARY
[de-identified] : (Assessment)\par \par History, clinical examination and imaging were most consistent with:\par -left knee osteoarthritis\par \par The diagnosis was explained in detail. The potential non-surgical and surgical treatments were reviewed. The relative risks and benefits of each option were considered relative to the patient’s age, activity level, medical history, symptom severity and previously attempted treatments. \par \par -The patient chose to continue with non-surgical treatment.\par -The patient is interested in another cortisone injection today for symptom relief as it has helped before\par -We discussed that should she fail to improve, she would be a candidate for TKA referral. \par -We also discussed that if symptoms recur and she is not interested in surgical discussion, we could consider repeat HA series. \par \par \par (MDM) \par \par Problem Complexity\par -Moderate: chronic illness with exacerbation\par \par Risk\par -Moderate: injection\par \par \par (Injection Procedure Note)\par \par Laterality \par -Left\par \par Location\par -Knee joint\par \par Contents\par -40 mg kenalog \par -5 mL of 1% lidocaine\par \par Narrative\par -Discussed possible risks of corticosteroid injection including hyperglycemia, infection and skin discoloration. \par - An interval of at least 3 months between an injection and any future surgical intervention involving implants was recommended to mitigate infection risk.\par -Informed consent was obtained.\par -Injection performed using aseptic technique. Tolerated well with no complications.

## 2023-03-16 NOTE — HISTORY OF PRESENT ILLNESS
[de-identified] : Body part causing symptoms is the left knee\par Pain score at rest is 1/10\par Pain score during activity is 5/10\par Treatments since last visit include: CSI last visit 12/15/22\par Compared to last visit, symptoms are slightly improved. She is interested in another injection. She has been doing exercises at home but not doing formal PT because of cost concerns

## 2023-03-16 NOTE — IMAGING
[de-identified] : Left Knee Examination:\par \par • Constitutional: \par  - Well-developed, well-nourished, in no acute distress\par  - Alert and oriented to person, place and time \par \par • Inspection and Palpation: \par  - Skin: intact\par  - Effusion: none\par  - Medial joint line tenderness: positive\par  - Lateral joint line tenderness: negative\par  - Patellofemoral tenderness: negative\par  - Pes anserine tenderness: negative\par  - Palpable lymphadenopathy: none \par  - Peripheral edema: none\par  - Gait: normal\par \par • Range of Motion (degrees):\par  - Extension: 0\par  - Flexion: 135\par 	\par • Stability: \par  - Anterior drawer test: normal\par  - Lachman test: 1A\par  - Posterior drawer test: normal\par  - Varus stress test: normal\par  - Valgus stress test: normal \par \par • Neurovascular: \par  - Atrophy of quadriceps: absent \par  - Quadriceps strength (out of 5): 5\par  - Hamstring strength (out of 5): 5\par  - Sensation to light touch: intact in sural, saphenous, peroneal and tibial distributions\par  - Capillary refill: less than 2 seconds in toes\par \par • Special Tests:\par  - Medial Rodney’s test: positive\par  - Lateral Rodney’s test: negative \par  - Patellofemoral grind: negative\par  - Straight leg raise test: negative\par  - Hip examination: painless range of motion\par \par • Comments:\par  - None

## 2023-05-01 NOTE — PROCEDURE
[FreeTextEntry3] : • Injection Details: \par  - Laterality: left\par  - Body part: knee \par  - Anatomic location: joint \par \par • Injection Contents: \par  - 1st Medication: Orthovisc\par  - 2nd Medication: 1% lidocaine, 5 mL\par \par • Procedure Narrative:\par  - Informed consent was provided for injection of the specified location. \par  - Patient informed of hyaluronic acid risks including infection and local inflammatory reaction.\par  - The above specified medications were injected using aseptic technique. \par  - A sterile dressing was applied. \par  - There were no complications. \par 
done